# Patient Record
Sex: MALE | Race: OTHER
[De-identification: names, ages, dates, MRNs, and addresses within clinical notes are randomized per-mention and may not be internally consistent; named-entity substitution may affect disease eponyms.]

---

## 2019-10-29 ENCOUNTER — HOSPITAL ENCOUNTER (EMERGENCY)
Dept: HOSPITAL 41 - JD.ED | Age: 29
Discharge: HOME | End: 2019-10-29
Payer: COMMERCIAL
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
## 2019-10-29 DIAGNOSIS — F17.210: ICD-10-CM
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
## 2019-10-29 DIAGNOSIS — S39.012A: Primary | ICD-10-CM

## 2019-10-29 PROCEDURE — 90686 IIV4 VACC NO PRSV 0.5 ML IM: CPT

## 2019-10-29 PROCEDURE — 99283 EMERGENCY DEPT VISIT LOW MDM: CPT

## 2019-10-29 PROCEDURE — G0008 ADMIN INFLUENZA VIRUS VAC: HCPCS

## 2019-10-29 PROCEDURE — 90471 IMMUNIZATION ADMIN: CPT

## 2021-10-21 NOTE — EDM.PDOC
Blepharitis instruction sheet given. ED HPI GENERAL MEDICAL PROBLEM





- General


Chief Complaint: Back Pain or Injury


Stated Complaint: BACK PAIN


Time Seen by Provider: 10/29/19 23:16


Source of Information: Reports: Patient, Family (Wife, 2 other family members)


History Limitations: Reports: No Limitations





- History of Present Illness


INITIAL COMMENTS - FREE TEXT/NARRATIVE: 





Mr. Choi is a most pleasant 29-year-old man with no chronic medical issues, 

who states that he felt a "pop" and developed sudden-onset severe lower back 

pain while lifting some kitchen equipment at work around 21:00 this evening. He 

states that the equipment was not heavy, but that he had to be in an odd 

position in order to lift it. The pain is limited to his lower back, and does 

not radiate down either lower extremity. No tingling, numbness, or weakness to 

either lower extremity. He feels best if he is supine or standing, but worse if 

he walks. No prior similar symptoms.





The patient took 1 tablet of oxycodone, left over from a dental procedure in 

December 2018, prior to coming to the ED. He states that the oxycodone did help 

with his pain.








The patient does not have a PCP.


He has not received an influenza vaccine this season.








  ** Lower Back


Pain Score (Numeric/FACES): 8





- Related Data


 Allergies











Allergy/AdvReac Type Severity Reaction Status Date / Time


 


No Known Allergies Allergy   Verified 10/29/19 22:16











Home Meds: 


 Home Meds





Orphenadrine [Norflex] 1 tab PO Q12H PRN #14 tab.er 10/29/19 [Rx]











Past Medical History





- Past Surgical History


HEENT Surgical History: Reports: Oral Surgery (wisdom teeth extraction)





Social & Family History





- Tobacco Use


Smoking Status *Q: Light Tobacco Smoker


Years of Tobacco use: 17


Packs/Tins Daily: 0.3


Packs/Tins Daily Comment: Down from 1/2 ppd





- Caffeine Use


Caffeine Use: Reports: None





- Alcohol Use


Alcohol Use History: Yes


Alcohol Use Frequency: Socially (occasiionally to excess)





- Recreational Drug Use


Recreational Drug Use: No





- Living Situation & Occupation


Living situation: Reports: , with Spouse, with Family (1 child)


Occupation: Employed ( at [employer])





ED ROS GENERAL





- Review of Systems


Review Of Systems: ROS reveals no pertinent complaints other than HPI.





ED EXAM,LOWER BACK PAIN/INJURY





- Physical Exam


Exam: See Below


Exam Limited By: No Limitations


General Appearance: Alert, No Apparent Distress, Thin


Eye Exam: Bilateral Eye: EOMI, Normal Inspection


Ears: Normal External Exam, Hearing Grossly Normal


Nose: Normal Inspection


Throat/Mouth: Normal Inspection, Normal Lips, Normal Voice, No Airway Compromise


Head: Atraumatic, Normocephalic


Neck: Normal Inspection, Full Range of Motion


Respiratory/Chest: No Respiratory Distress, Lungs Clear, Normal Breath Sounds, 

No Accessory Muscle Use


Cardiovascular: Normal Peripheral Pulses, Regular Rate, Rhythm, No Edema, No 

Gallop, No JVD, No Murmur, No Rub


GI/Abdominal: Normal Bowel Sounds, Soft, Non-Tender, No Organomegaly, No 

Distention, No Abnormal Bruit, No Mass


 (Male) Exam: Deferred


Rectal (Males) Exam: Deferred


Back Exam: Other (No visible abnormalities to the patient's lower back, such as 

swelling, erythema, ecchymosis, or abrasion. The patient reports tenderness 

primarily to the left paravertebral muscle at about the L4 level, less 

tenderness to the right paravertebral muscle at about the L4 level. Straight 

leg raise induces lower back pain without lower extremity radicular pain at 

about 40, bilaterally. The patient is able to flex his spine to about 10, but 

extend his spine to about 30. He is able to tilt his spine to about 15 to the 

left, and 30 to the right. He is able to twist his spine to about 30 

bilaterally. Unilateral knee bend is normal bilaterally.)


Extremities: Normal Inspection, Normal Range of Motion, No Pedal Edema, Normal 

Capillary Refill


Neurological: Alert, Normal Dorsiflexion, Normal Plantar Flexion, No Motor/

Sensory Deficits, Oriented x 3


Psychiatric: Normal Affect


Skin Exam: Warm, Dry, Intact, Normal Color, No Rash





Course





- Vital Signs


Last Recorded V/S: 


 Last Vital Signs











Temp  37.1 C   10/29/19 22:14


 


Pulse  73   10/29/19 22:14


 


Resp  16   10/29/19 22:14


 


BP  130/97 H  10/29/19 22:14


 


Pulse Ox  98   10/29/19 22:14














- Orders/Labs/Meds


Orders: 


 Active Orders 24 hr











 Category Date Time Status


 


 Influenza Vaccine Charge [RC] .DISCHARGE Care  10/29/19 23:42 Active


 


 Pharmacy to Dose - InFluenza V [Pharmacy to Dose - Med  10/29/19 23:42 Pending





 InFluenza Vaccine]   





 1 each IM ONETIME ONE   








 Medication Orders





Influenza Virus Vaccine (Pharmacy To Dose - Influenza Vaccine)  1 each IM 

ONETIME ONE


   Stop: 10/29/19 23:43








Meds: 


Medications











Generic Name Dose Route Start Last Admin





  Trade Name Freq  PRN Reason Stop Dose Admin


 


Influenza Virus Vaccine  1 each  10/29/19 23:42  





  Pharmacy To Dose - Influenza Vaccine  IM  10/29/19 23:43  





  ONETIME ONE   





     





     





     





     














Discontinued Medications














Generic Name Dose Route Start Last Admin





  Trade Name Freq  PRN Reason Stop Dose Admin


 


Ibuprofen  600 mg  10/29/19 23:42  10/29/19 23:59





  Motrin  PO  10/29/19 23:43  600 mg





  ONETIME ONE   Administration





     





     





     





     


 


Influenza Virus Vaccine  Confirm  10/29/19 23:58  10/30/19 00:01





  Fluzone Quad [id number] Syringe  Administered  10/29/19 23:59  60 mcg





  Dose   Administration





  60 mcg   





  .ROUTE   





  .[facility] ONE   





     





     





     





     














- Re-Assessments/Exams


Free Text/Narrative Re-Assessment/Exam: 





10/29/19 23:43


The patient's history and physical exam are most consistent with his back pain 

being due to a muscle spasm, not due to a herniated intervertebral disc. I am 

recommending that he start taking a muscle relaxant, such as Norflex, but 

instead of starting it now (around midnight), I will prescribe it, and he will 

start taking it in the morning, then every 12 hours. He will be started on 

ibuprofen, and I will recommend that he continue to take that every 8 hours. I 

will write a note for him for work for a few days, and over those few days, I 

would like the patient to stay active - swimming is best, but walking is good, 

as well. The patient stated that he will likely get some massage, and under 

these circumstances, I explained that chiropractic manipulation may be 

beneficial, as well.





The patient will receive an influenza vaccine prior to discharge.





Departure





- Departure


Time of Disposition: 23:44


Disposition: Home, Self-Care 01


Condition: Good


Clinical Impression: 


 Strain of muscle, fascia and tendon of lower back, initial encounter








- Discharge Information


*PRESCRIPTION DRUG MONITORING PROGRAM REVIEWED*: Not Applicable


*COPY OF PRESCRIPTION DRUG MONITORING REPORT IN PATIENT VIELKA: Not Applicable


Prescriptions: 


Orphenadrine [Norflex] 1 tab PO Q12H PRN #14 tab.er


 PRN Reason: Muscle Spasm


Instructions:  Lumbosacral Strain


Referrals: 


PCP,None [Primary Care Provider] - 


Forms:  ED Department Discharge, ED Return to Work/School Form


Additional Instructions: 


You were seen in the emergency room after developing sudden-onset lower back 

pain after lifting some kitchen equipment tonight.





Based on your history and physical examination, your lower back pain is most 

likely due to a muscle spasm/strain.





You have been started on the anti-inflammatory medicine ibuprofen. Ibuprofen is 

available over-the-counter. Take 3 tablets (600 mg) of ibuprofen every 8 hours, 

with food, around-the-clock, for the next several days.





A prescription for the muscle relaxant Norflex has been sent to the [facility] 

located in the [employer] store. Take one tablet of Norflex every 12 

hours, starting tomorrow morning, Wednesday, 10/30/2019, as prescribed.





A note for work has been provided to you. During your time off, we recommend 

that you stay active. Swimming is best, but walking is good, as well. Try to 

avoid flexing or twisting, and you should minimize how much you lift.





If any other problems, please do not hesitate to return to the ER.








*You received an influenza vaccine during your ER visit.*





- My Orders


Last 24 Hours: 


My Active Orders





10/29/19 23:42


Influenza Vaccine Charge [RC] .DISCHARGE 


Pharmacy to Dose - InFluenza V [Pharmacy to Dose - InFluenza Vaccine]   1 each 

IM ONETIME ONE 














- Assessment/Plan


Last 24 Hours: 


My Active Orders





10/29/19 23:42


Influenza Vaccine Charge [RC] .DISCHARGE 


Pharmacy to Dose - InFluenza V [Pharmacy to Dose - InFluenza Vaccine]   1 each 

IM ONETIME ONE

## 2021-11-12 NOTE — EDM.PDOC
ED HPI GENERAL MEDICAL PROBLEM





- General


Chief Complaint: Laceration


Stated Complaint: Finger laceration


Time Seen by Provider: 11/12/21 12:53


Source of Information: Reports: Patient, RN Notes Reviewed


History Limitations: Reports: No Limitations





- History of Present Illness


INITIAL COMMENTS - FREE TEXT/NARRATIVE: 





Patient is a 31-year-old male who presents to the ER for evaluation of the left 

index finger laceration.  He was working in a local restaurant kitchen, cutting 

some bread when he ended up slicing the distal portion of his left index finger.

 This resulted in about a 2 cm laceration, bleeding somewhat under control, but 

still oozing a little bit at the time of triage.  Complaining of may be some 

mild numbness to the distal portion of his fingertip but otherwise 

neurovascularly intact.  This does not violate the nail bed.  And it does run in

a horizontal fashion along the ulnar aspect of the patient's left index finger. 

This is linear in nature.  Patient's not sure of when he had his last tetanus 

booster.  Patient denies any other sick-like symptoms, fever/chills, 

cough/shortness of breath, nausea/vomiting/diarrhea.





  ** Left Hand


Pain Score (Numeric/FACES): 5





- Related Data


                                    Allergies











Allergy/AdvReac Type Severity Reaction Status Date / Time


 


No Known Allergies Allergy   Verified 11/12/21 12:50











Home Meds: 


                                    Home Meds





. [No Known Home Meds]  11/12/21 [History]











Past Medical History





- Past Health History


Medical/Surgical History: Denies Medical/Surgical History





- Past Surgical History


HEENT Surgical History: Reports: Oral Surgery





Social & Family History





- Tobacco Use


Tobacco Use Status *Q: Current Every Day Tobacco User


Years of Tobacco use: 10


Packs/Tins Daily: 0.2





- Caffeine Use


Caffeine Use: Reports: Coffee, Soda





- Recreational Drug Use


Recreational Drug Use: No





- Living Situation & Occupation


Living situation: Reports: , with Spouse, with Family (1 child)


Occupation: Employed ( at [employer])





ED ROS GENERAL





- Review of Systems


Review Of Systems: Comprehensive ROS is negative, except as noted in HPI.





ED EXAM, SKIN/RASH


Exam: See Below


Exam Limited By: No Limitations


General Appearance: Alert, WD/WN, No Apparent Distress


Respiratory/Chest: No Respiratory Distress, Lungs Clear, Normal Breath Sounds, 

No Accessory Muscle Use, Chest Non-Tender


Cardiovascular: Normal Peripheral Pulses, Regular Rate, Rhythm, No Edema


Peripheral Pulses: 2+: Radial (L), Radial (R)


Extremities: Normal Inspection, Normal Capillary Refill


Neurological: Alert, Oriented, Normal Cognition, No Motor/Sensory Deficits (no 

motor defecits appreciated- pt does complain of some numbness to the distal left

 anterior index finger tip)


Psychiatric: Normal Affect, Normal Mood


Skin: Warm, Dry, Normal Color, Wound/Incision (2 cm linear laceration to the 

left index finger, horizontal in nature, on the anterior ulnar surface of the 

patient's index finger, )





ED SKIN PROCEDURES





- Laceration/Wound Repair


  ** Left Anterior Distal Digit - 2nd (Index)


Appearance: Subcutaneous, Linear, Clean


Distal NVT: Neuro & Vascular Intact, No Tendon Injury


Anesthetic Type: Local


Local Anesthesia - Lidocaine (Xylocaine): 1% Plain


Local Anesthetic Volume: 3cc


Skin Prep: Chlorhexidine (Hibiciens), Saline


Exploration/Debridement/Repair: Wound Explored, In a Bloodless Field, Explored 

to Base, No Foreign Material Found


Closed with: Sutures


Lac/Wound length In cm: 2


Suture Size: 4-0


# of Sutures: 9


Suture Type: Prolene, Interrupted, Simple


Sterile Dressing Applied: Nurse


Tetanus Status Addressed: Yes


Complications: No





Course





- Vital Signs


Last Recorded V/S: 


                                Last Vital Signs











Temp  97.9 F   11/12/21 12:49


 


Pulse  77   11/12/21 12:49


 


Resp  20   11/12/21 12:49


 


BP  142/95 H  11/12/21 12:49


 


Pulse Ox  100   11/12/21 12:49














- Orders/Labs/Meds


Meds: 


Medications














Discontinued Medications














Generic Name Dose Route Start Last Admin





  Trade Name Taylor  PRN Reason Stop Dose Admin


 


Diphtheria/Tetanus/Acell Pertussis  0.5 ml  11/12/21 13:15  11/12/21 13:31





  Diphtheria,Pertussis(Acell),Tetanus Vaccine 0.5 Ml Syringe  IM  11/12/21 13:16

  0.5 ml





  .ONCE ONE   Administration


 


Lidocaine HCl  10 ml  11/12/21 12:53  11/12/21 12:59





  Lidocaine 1% 10 Ml Mdv  INJECT  11/12/21 12:54  10 ml





  ONETIME ONE   Administration














Departure





- Departure


Time of Disposition: 13:18


Disposition: Home, Self-Care 01


Condition: Good


Clinical Impression: 


Laceration of left index finger w/o foreign body w/o damage to nail


Qualifiers:


 Encounter type: initial encounter Qualified Code(s): S61.211A - Laceration 

without foreign body of left index finger without damage to nail, initial 

encounter








- Discharge Information


*PRESCRIPTION DRUG MONITORING PROGRAM REVIEWED*: No


*COPY OF PRESCRIPTION DRUG MONITORING REPORT IN PATIENT VIELKA: No


Instructions:  Sutures, Staples, or Adhesive Wound Closure, Easy-to-Read


Referrals: 


PCP,None [Primary Care Provider] - 


Forms:  ED Department Discharge


Additional Instructions: 


You have been evaluated in the ED for your laceration.





Sutures will need to stay in for about 10 days. You may return to the ED or any 

clinic for removal.





Please keep this area clean and dry, you may cleanse with regular soap and 

water. No vigorous scrubbing. Please try to avoid submerging the affected area 

in water for prolonged periods of time until the sutures are removed.





Watch out for signs of infection like increased redness, swelling, pain at the 

laceration site, or if you should develop any fevers or chills.





Please return to ED if your symptoms change or worsen.








Sepsis Event Note (ED)





- Focused Exam


Vital Signs: 


                                   Vital Signs











  Temp Pulse Resp BP Pulse Ox


 


 11/12/21 12:49  97.9 F  77  20  142/95 H  100